# Patient Record
Sex: FEMALE | Race: BLACK OR AFRICAN AMERICAN | Employment: UNEMPLOYED | URBAN - METROPOLITAN AREA
[De-identification: names, ages, dates, MRNs, and addresses within clinical notes are randomized per-mention and may not be internally consistent; named-entity substitution may affect disease eponyms.]

---

## 2017-01-03 ENCOUNTER — TELEPHONE (OUTPATIENT)
Dept: FAMILY MEDICINE CLINIC | Age: 77
End: 2017-01-03

## 2017-01-03 ENCOUNTER — OFFICE VISIT (OUTPATIENT)
Dept: FAMILY MEDICINE CLINIC | Age: 77
End: 2017-01-03

## 2017-01-03 VITALS
HEART RATE: 103 BPM | OXYGEN SATURATION: 95 % | DIASTOLIC BLOOD PRESSURE: 73 MMHG | SYSTOLIC BLOOD PRESSURE: 129 MMHG | HEIGHT: 66 IN | RESPIRATION RATE: 19 BRPM | WEIGHT: 156 LBS | BODY MASS INDEX: 25.07 KG/M2 | TEMPERATURE: 99 F

## 2017-01-03 DIAGNOSIS — R19.00 PELVIC MASS IN FEMALE: Primary | ICD-10-CM

## 2017-01-03 DIAGNOSIS — R11.2 NON-INTRACTABLE VOMITING WITH NAUSEA, UNSPECIFIED VOMITING TYPE: ICD-10-CM

## 2017-01-03 RX ORDER — ONDANSETRON 4 MG/1
4 TABLET, ORALLY DISINTEGRATING ORAL
Qty: 30 TAB | Refills: 1 | Status: SHIPPED | OUTPATIENT
Start: 2017-01-03

## 2017-01-03 RX ORDER — ONDANSETRON 4 MG/1
4 TABLET, ORALLY DISINTEGRATING ORAL
Qty: 30 TAB | Refills: 1 | Status: SHIPPED | OUTPATIENT
Start: 2017-01-03 | End: 2017-01-03 | Stop reason: SDUPTHER

## 2017-01-03 RX ORDER — HYDROCODONE BITARTRATE AND ACETAMINOPHEN 5; 325 MG/1; MG/1
1 TABLET ORAL
Qty: 20 TAB | Refills: 0 | Status: SHIPPED | OUTPATIENT
Start: 2017-01-03

## 2017-01-03 RX ORDER — MEGESTROL ACETATE 20 MG/1
20 TABLET ORAL DAILY
Qty: 30 TAB | Refills: 1 | Status: SHIPPED | OUTPATIENT
Start: 2017-01-03

## 2017-01-03 RX ORDER — MEGESTROL ACETATE 20 MG/1
20 TABLET ORAL DAILY
Qty: 30 TAB | Refills: 1 | Status: SHIPPED | OUTPATIENT
Start: 2017-01-03 | End: 2017-01-03 | Stop reason: SDUPTHER

## 2017-01-03 NOTE — TELEPHONE ENCOUNTER
Returned call, and spoke with Erica Baez that due to transportation issues. Patient would like to keep already scheduled appointment on Thursday. All information has been faxed to [place] [facility] ahead of patients scheduled appointment.

## 2017-01-03 NOTE — MR AVS SNAPSHOT
Visit Information Date & Time Provider Department Dept. Phone Encounter #  
 1/3/2017  9:45 AM Nicholas Mejia, [address] [id number] [phone] Follow-up Instructions Return in about 2 weeks (around 1/17/2017) for pain and nausea. Upcoming Health Maintenance Date Due DTaP/Tdap/Td series (1 - Tdap) 6/1/1963 FOBT Q 1 YEAR AGE 50-75 6/1/1992 ZOSTER VACCINE AGE 60> 6/1/2002 GLAUCOMA SCREENING Q2Y 6/1/2007 OSTEOPOROSIS SCREENING (DEXA) 6/1/2007 Pneumococcal 65+ High/Highest Risk (1 of 2 - PCV13) 6/1/2007 MEDICARE YEARLY EXAM 10/4/2017 Allergies as of 1/3/2017  Review Complete On: 1/3/2017 By: Nicholas Mejia MD  
 No Known Allergies Current Immunizations  Never Reviewed No immunizations on file. Not reviewed this visit You Were Diagnosed With   
  
 Codes Comments Pelvic mass in female    -  Primary ICD-10-CM: R19.00 ICD-9-CM: 789.30 Non-intractable vomiting with nausea, unspecified vomiting type     ICD-10-CM: R11.2 ICD-9-CM: 787.01 Vitals BP Pulse Temp Resp Height(growth percentile) Weight(growth percentile) 129/73 (BP 1 Location: Right arm, BP Patient Position: Sitting) (!) 103 99 °F (37.2 °C) (Oral) 19 5' 6\" (1.676 m) 156 lb (70.8 kg) SpO2 BMI OB Status Smoking Status 95% 25.18 kg/m2 Postmenopausal Former Smoker BMI and BSA Data Body Mass Index Body Surface Area  
 25.18 kg/m 2 1.82 m 2 Preferred Pharmacy Pharmacy Name Phone [facility] - [address], [address] [phone] Your Updated Medication List  
  
   
This list is accurate as of: 1/3/17 10:17 AM.  Always use your most recent med list.  
  
  
  
  
 azelastine-fluticasone 137-50 mcg/spray [place]  
by Nasal route. cyanocobalamin 1,000 mcg tablet Take 1,000 mcg by mouth daily. diazePAM 5 mg tablet Commonly known as:  VALIUM  
 Take 5 mg by mouth every six (6) hours as needed for Anxiety. Take 1/2 tablet by mouth HYDROcodone-acetaminophen 5-325 mg per tablet Commonly known as:  Connie Higgins Take 1 Tab by mouth every six (6) hours as needed for Pain. Max Daily Amount: 4 Tabs.  
  
 loratadine 10 mg tablet Commonly known as:  Reyes Mariano Take 2 Tabs by mouth daily. Indications: ALLERGIC RHINITIS  
  
 lovastatin 40 mg tablet Commonly known as:  MEVACOR Take 40 mg by mouth nightly. megestrol 20 mg tablet Commonly known as:  MEGACE Take 1 Tab by mouth daily. ondansetron 4 mg disintegrating tablet Commonly known as:  ZOFRAN ODT Take 1 Tab by mouth every eight (8) hours as needed for Nausea. potassium chloride 20 mEq tablet Commonly known as:  K-DUR, KLOR-CON Take 1 Tab by mouth daily. * valsartan-hydroCHLOROthiazide 160-12.5 mg per tablet Commonly known as:  DIOVAN-HCT Take 1 Tab by mouth every evening. * valsartan-hydroCHLOROthiazide 80-12.5 mg per tablet Commonly known as:  DIOVAN-HCT Take 1 Tab by mouth daily. * Notice: This list has 2 medication(s) that are the same as other medications prescribed for you. Read the directions carefully, and ask your doctor or other care provider to review them with you. Prescriptions Printed Refills HYDROcodone-acetaminophen (NORCO) 5-325 mg per tablet 0 Sig: Take 1 Tab by mouth every six (6) hours as needed for Pain. Max Daily Amount: 4 Tabs. Class: Print Route: Oral  
  
Prescriptions Sent to Pharmacy Refills  
 ondansetron (ZOFRAN ODT) 4 mg disintegrating tablet 1 Sig: Take 1 Tab by mouth every eight (8) hours as needed for Nausea. Class: Normal  
 Pharmacy: [facility] [employer] [address] #: [phone] Route: Oral  
 megestrol (MEGACE) 20 mg tablet 1 Sig: Take 1 Tab by mouth daily.   
 Class: Normal  
 Pharmacy: [facility] [facility] [address] #: [phone] Route: Oral  
  
Follow-up Instructions Return in about 2 weeks (around 1/17/2017) for pain and nausea. Introducing [facility]! Dear Isai Khan: Thank you for requesting a [employer] account. Our records indicate that you already have an active [employer] account. You can access your account anytime at [email or web address] Did you know that you can access your hospital and ER discharge instructions at any time in [employer]? You can also review all of your test results from your hospital stay or ER visit. Additional Information If you have questions, please visit the Frequently Asked Questions section of the [employer] website at [email or web address] Remember, [employer] is NOT to be used for urgent needs. For medical emergencies, dial 911. Now available from your iPhone and Android! Please provide this summary of care documentation to your next provider. Your primary care clinician is listed as Kristopher Fay. If you have any questions after today's visit, please call [phone].

## 2017-01-03 NOTE — PROGRESS NOTES
Noreen Gregg is a 78 y.o.  female and presents with    Chief Complaint   Patient presents with   [facility] Follow Up       Subjective:  Ms. Alena Mclaughlin returns to the office after ER visit 2 weeks ago for ongoing abdominal pain and vomiting. She continues to have the symptoms. She reports that she has loss of appetite. She has used zofran with some improvement in symptoms. She feels unsteady with ambulation. She is only able to tolerate apple sauce. She has constipation and is using dulcolax. She reports that the miralax upset her stomach. She was found to have a pelvic mass in the ER. Cardiovascular Review:  The patient has hypertension and hyperlipidemia. Diet and Lifestyle: generally follows a low fat low cholesterol diet, generally follows a low sodium diet, nonsmoker  Home BP Monitoring: is well controlled at home. Pertinent ROS: taking medications as instructed, no medication side effects noted, no TIA's, no chest pain on exertion, no dyspnea on exertion, no swelling of ankles. Patient Active Problem List   Diagnosis Code    Hypertension I10    Hyperlipidemia E78.5    Allergic rhinitis J30.9    Advance care planning Z71.89     Patient Active Problem List    Diagnosis Date Noted    Allergic rhinitis 10/03/2016    Advance care planning 10/03/2016    Hypertension 03/01/2016    Hyperlipidemia 03/01/2016     Current Outpatient Prescriptions   Medication Sig Dispense Refill    ondansetron (ZOFRAN ODT) 4 mg disintegrating tablet Take 1 Tab by mouth every eight (8) hours as needed for Nausea. 10 Tab 0    valsartan-hydrochlorothiazide (DIOVAN-HCT) 80-12.5 mg per tablet Take 1 Tab by mouth daily. 90 Tab 3    valsartan-hydrochlorothiazide (DIOVAN-HCT) 160-12.5 mg per tablet Take 1 Tab by mouth every evening. 90 Tab 3    butalbital-acetaminophen-caffeine (FIORICET, ESGIC) -40 mg per tablet Take 1 Tab by mouth three (3) times daily.       cyanocobalamin 1,000 mcg tablet Take 1,000 mcg by mouth daily.  potassium chloride (K-DUR, KLOR-CON) 20 mEq tablet Take 1 Tab by mouth daily. 7 Tab 0    polyethylene glycol (MIRALAX) 17 gram packet Take 1 Packet by mouth daily. 30 Each 1    loratadine (CLARITIN) 10 mg tablet Take 2 Tabs by mouth daily. Indications: ALLERGIC RHINITIS 60 Tab 1    lovastatin (MEVACOR) 40 mg tablet Take 40 mg by mouth nightly.  diazepam (VALIUM) 5 mg tablet Take 5 mg by mouth every six (6) hours as needed for Anxiety. Take 1/2 tablet by mouth      azelastine-fluticasone 137-50 mcg/spray [place] by Nasal route. No Known Allergies  Past Medical History   Diagnosis Date    Allergic rhinitis     Hypercholesterolemia     Hypertension     Pelvic mass      Past Surgical History   Procedure Laterality Date    Hx wisdom teeth extraction       Family History   Problem Relation Age of Onset    Dementia Mother      Social History   Substance Use Topics    Smoking status: Former Smoker     Packs/day: 0.25     Years: 10.00    Smokeless tobacco: Never Used    Alcohol use No       ROS   General ROS: positive for  - weight loss  negative for - night sweats  Psychological ROS: negative for - anxiety or depression  Ophthalmic ROS: positive for - uses glasses  ENT ROS: positive for - headaches  Respiratory ROS: no cough, shortness of breath, or wheezing  Cardiovascular ROS: no chest pain or dyspnea on exertion  Gastrointestinal ROS: positive for - abdominal pain and nausea/vomiting  Genito-Urinary ROS: no dysuria, trouble voiding, or hematuria  Dermatological ROS: negative for - rash or skin lesion changes    All other systems reviewed and are negative.       Objective:  Vitals:    01/03/17 0949   BP: 129/73   Pulse: (!) 103   Resp: 19   Temp: 99 °F (37.2 °C)   TempSrc: Oral   SpO2: 95%   Weight: 156 lb (70.8 kg)   Height: 5' 6\" (1.676 m)   PainSc:   9   PainLoc: Head       alert, well appearing, and in no distress, oriented to person, place, and time and overweight  Mental status - normal mood, behavior, speech, dress, motor activity, and thought processes  Chest - clear to auscultation, no wheezes, rales or rhonchi, symmetric air entry  Heart - normal rate, regular rhythm, normal S1, S2, no murmurs, rubs, clicks or gallops  Abdomen - soft, nondistended, no masses or organomegaly  tenderness noted suprapubic    LABS   K 2.5  TESTS  CT abd pelv  IMPRESSION:  1. Very large mass arising from the pelvis with no normal uterine/cervical  tissue identified, presumably a primary malignancy. Small volume ascites without  definite nodularity to more strongly suggest carcinomatosis.     2. Mass results in moderate bilateral hydronephrosis/ureterectasis. Assessment/Plan:    1. Pelvic mass in female  Referred to gynecology oncology; treat symptoms of pain and nausea and anorexia  - ondansetron (ZOFRAN ODT) 4 mg disintegrating tablet; Take 1 Tab by mouth every eight (8) hours as needed for Nausea. Dispense: 30 Tab; Refill: 1  - megestrol (MEGACE) 20 mg tablet; Take 1 Tab by mouth daily. Dispense: 30 Tab; Refill: 1  - HYDROcodone-acetaminophen (NORCO) 5-325 mg per tablet; Take 1 Tab by mouth every six (6) hours as needed for Pain. Max Daily Amount: 4 Tabs. Dispense: 20 Tab; Refill: 0    2. Non-intractable vomiting with nausea, unspecified vomiting type  Needs potassium; continue antiemetic  - ondansetron (ZOFRAN ODT) 4 mg disintegrating tablet; Take 1 Tab by mouth every eight (8) hours as needed for Nausea. Dispense: 30 Tab; Refill: 1      Lab review: labs reviewed, I note that basic metabolic panel abnormal low K      I have discussed the diagnosis with the patient and the intended plan as seen in the above orders. The patient has received an after-visit summary and questions were answered concerning future plans. I have discussed medication side effects and warnings with the patient as well.  I have reviewed the plan of care with the patient, accepted their input and they are in agreement with the treatment goals. Follow-up Disposition:  Return in about 2 weeks (around 1/17/2017) for pain and nausea.

## 2017-01-03 NOTE — TELEPHONE ENCOUNTER
[facility] to pt [facility] appt coming up this week.  She stated there was an opening tomorrow but wanted to speak with Clifford Tsang first. Please assist.

## 2017-02-03 ENCOUNTER — PATIENT OUTREACH (OUTPATIENT)
Dept: FAMILY MEDICINE CLINIC | Age: 77
End: 2017-02-03

## 2017-02-03 NOTE — PROGRESS NOTES
Nurse Navigator  POST HOSPITALIZATION NOTE  Admitted at THE [facility] on 01/25/17 for hypokalemia  Discharged to Home on 01/28/17        -attempted to reach patient for post hosp f/u. Unable to reach. Left message on voicemail      -Per DC summary, GYN office Dr. Indiana Escobar supposed to follow up with patient to reschedule surgery. I call Dr. Lamar Decker office ([phone]).  Attempted to reach , unable to reach, left message on voicemail with contact info      Will call again to follow up

## 2017-02-06 ENCOUNTER — TELEPHONE (OUTPATIENT)
Dept: FAMILY MEDICINE CLINIC | Age: 77
End: 2017-02-06

## 2017-02-06 ENCOUNTER — PATIENT OUTREACH (OUTPATIENT)
Dept: FAMILY MEDICINE CLINIC | Age: 77
End: 2017-02-06

## 2017-02-06 NOTE — PROGRESS NOTES
Nurse Navigator  POST HOSPITALIZATION NOTE  Admitted at THE [facility] on 01/25/17 for hypokalemia  Discharged to Home on 01/28/17      Noted telephone encounter, pt's surgery rescheduled to 02/08/17.  Pt declined call back from NN, will follow up as needed

## 2017-02-06 NOTE — TELEPHONE ENCOUNTER
Pt returning call from Rob. Does not wish to be called back but wanted to let her know her surgery wa postponed due to low Potassium. It is R/s for 2/8/17 @ 5:30am. Thank you for calling.

## 2017-02-16 ENCOUNTER — PATIENT OUTREACH (OUTPATIENT)
Dept: FAMILY MEDICINE CLINIC | Age: 77
End: 2017-02-16

## 2017-02-16 NOTE — PROGRESS NOTES
Nurse Navigator  POST HOSPITALIZATION NOTE  -Admitted at THE [facility] on 01/25/17 for hypokalemia  -Discharged to Home on 01/28/17  -Readmitted for scheduled admission 02/08/17 at THE [facility] for abdominal surgery (Ovarian tumor)  -Discharged to Home on 02/14/17      Attempted to reach patient for post hosp f/u. Unable to reach.  Left message on voicemail with contact info      Will call again to follow up for post hosp

## 2017-02-17 ENCOUNTER — PATIENT OUTREACH (OUTPATIENT)
Dept: FAMILY MEDICINE CLINIC | Age: 77
End: 2017-02-17

## 2017-02-17 NOTE — PROGRESS NOTES
Nurse Navigator  POST HOSPITALIZATION NOTE  -Admitted at THE [facility] on 01/25/17 for hypokalemia  -Discharged to Home on 01/28/17  -Readmitted for scheduled admission 02/08/17 at THE [facility] for abdominal surgery (Ovarian tumor)  -Discharged to Home on 02/14/17      -Emelda Francisco Dr. Ardella Bosworth office ([phone]). Spoke to . Pt's identity verified x2. Per , no follow up appt currently scheduled for the patient at this time    -Attempted to reach patient again for post hosp f/u. Unable to reach.  Left message on voicemail with contact info requesting call back      Letter sent

## 2017-02-17 NOTE — LETTER
2/17/2017 11:19 AM 
 
Ms. Lorne Schilder [address] [facility] [address] [id number] Dear Ms. Amilcar Vivar am a Nurse Navigator working with your primary care provider (PCP), Katty Milian. Part of my job is to follow up with patients who have been in the hospital to see how they are feeling, answer any questions they may have about their visit, and also make sure they have a follow-up appointment to see their primary care doctor and/or specialists. I have been unable to reach you by telephone and wanted to make sure that you schedule a follow-up appointment to come in and talk to your specialist, Dr. Frances Askew, as per your  discharge instructions. Please call Dr. Cornel Madrigal office at [phone] to schedule your follow up appointment as soon as possible. If you have any questions or concerns, please don't hesitate to call your PCP office at [phone]. Thank you for allowing us to participate in your care! Sincerely, Chloe MELENDEZN, RN   Nurse Navigator [address] Office   [phone] Fax   [phone]

## 2017-02-21 ENCOUNTER — PATIENT OUTREACH (OUTPATIENT)
Dept: FAMILY MEDICINE CLINIC | Age: 77
End: 2017-02-21

## 2017-02-21 ENCOUNTER — TELEPHONE (OUTPATIENT)
Dept: FAMILY MEDICINE CLINIC | Age: 77
End: 2017-02-21

## 2017-02-21 ENCOUNTER — NURSE NAVIGATOR (OUTPATIENT)
Dept: FAMILY MEDICINE CLINIC | Age: 77
End: 2017-02-21

## 2017-02-21 NOTE — PROGRESS NOTES
Nurse Navigator  POST HOSPITALIZATION NOTE  -Admitted at THE [facility] on 17 for hypokalemia  -Discharged to Home on 17  -Readmitted for scheduled admission 17 at THE [facility] for abdominal surgery (Ovarian tumor)  -Discharged to Home on 17         START: 1020   END: [facility]    Received phone call from patient. Verified patient's identity using name and . Introduced self/role and purpose of call. Pt stated \"I'm doing just fine. \" Pt denied any CP, SOB, fever/chills, NVD, HA/dizziness, numbness/tingling, bleeding, swelling, pain. Pt also reported incision site clean, dry, intact; denied any bleeding, drainage, swelling, redness. Currently open to air. Pt voiced no concerns at this time. Pt reported appetite better. She has been able to eat without having any nausea or vomiting. Pt stated she used to be a  and prepares her own food. Pt lives alone, but can call her brother for assistance. Reviewed home medication and patient verbalized understanding self management of medications. Pt reported she has Percocet if needed for pain, pt also reported taking theragran daily, colace daily, KDur 20Meq twice daily, zofran as needed. Pt stated she takes Diovan 160mg, cuts the pill in half and takes it twice daily. Pt also reported she is not taking HCTZ (recently prescribed per DC summary). Pt reported no problems with her BP, any HA/dizziness. Reviewed red flags for CP, SOB, fever/chills, NVD, bleeding, swelling, drainage, increase in pain, numbness/tingling, HA/dizziness, fall, LOC and patient understands when to seek medical attention from PCP/ED. Offered follow up appt with PCP Dr. Yaya Hathaway, pt declined at this time. Stated she will follow up with PCP after seeing other specialist. She will call to schedule a future appt. Pt does not have follow up appt with GYN Dr. Eli Rajan and requests NN to schedule for her.  NN will call to schedule and call her back to provide appt info. Patient verbalized understanding of discharge plan and special follow up with PCP/GYN. Reviewed plan of care. Patient has provided input to plan and agrees with goals. Answered any questions patient had. Provided contact info for any additional questions. This note will not be viewable in [address]

## 2017-02-21 NOTE — TELEPHONE ENCOUNTER
Pt called wanting to speak with Harvinder Argueta but she was on a call. I told her I could put in a message for Harvinder Argueta to call her back but she refused the message and said she would call back.

## 2017-02-22 NOTE — PROGRESS NOTES
Nurse Navigator  POST HOSPITALIZATION NOTE  -Admitted at THE [facility] on 01/25/17 for hypokalemia  -Discharged to Home on 01/28/17  -Readmitted for scheduled admission 02/08/17 at THE [facility] for abdominal surgery (Ovarian tumor)  -Discharged to Home on 02/14/17        -Emelda Francisco Dr. Ardella Bosworth office ([phone]). Follow up appt scheduled for patient on 03/02/17 at 4pm. Per , patient to follow up in 2 weeks post discharge. Dr. Mumtaz Lira not available on 02/28/17 and this is the earliest next appt available. I verbalized understanding. I also spoke to nurse Deatrice Barnet in regards to wound care. Per nurse, OK to leave incision site open to air, shower baths only, cleanse with soap and water only, and do not use wash cloth. I verbalized understanding    -Called back patient to relay appt info. She verbalized understanding. Stated her brother will be bringing her to the appt. Patient provided Dr. Ardella Bosworth address and office number. Also relayed info regarding wound care. Patient verbalized understanding.  NN contact info provided for any additions questions/concerns

## 2017-03-13 ENCOUNTER — TELEPHONE (OUTPATIENT)
Dept: FAMILY MEDICINE CLINIC | Age: 77
End: 2017-03-13

## 2017-03-13 NOTE — TELEPHONE ENCOUNTER
I contacted patient, and she just wanted to clarify that we know she takes two doses of valsartan, and that it has not changed. I informed patient that per our records she takes 1 tablet of valsartan 80-12.5mg tablet daily,  And of the valsartan 160-12.5mg a 1/2 tablet at noon, and 1/2 tablet at 5 pm. Patient stated that that was correct and had no further questions.

## 2017-03-13 NOTE — TELEPHONE ENCOUNTER
Pt called stating that she has been in and out of the hospital and has been having issues with her medication. She stated that Dr. Yaya Hathaway prescribed two Valsartan medications for her to take but when she went into the ED they made her stop taking one of them and her blood pressure went up. She stated that they tried to prescribe her a different type of High Blood pressure pill but she refused to take it because she prefers to take the one that Dr. Yaya Hathaway prescribed her. She would like to know more medical advice.  Please assist.

## 2017-03-29 ENCOUNTER — DOCUMENTATION ONLY (OUTPATIENT)
Dept: FAMILY MEDICINE CLINIC | Age: 77
End: 2017-03-29

## 2017-03-29 NOTE — PROGRESS NOTES
Patient called and wanted to have appointment canceled for her GYN appointment. Patient started to tell me that she was feeling dizzy and that she felt sick. Patient stated that she may have over done her errands yesterday. I called and spoke with Eb Claros LPN and wanted to have the aptient come in for a Same Day Sick appointment. I asked the patient if she could come in for an appointment, Patient denied coming in for an appointment, Patient stated that she has an appointment tomorrow and does not want to over do it again.

## 2017-03-30 NOTE — PROGRESS NOTES
I called patient to follow up on call to office yesterday. Patient stated that she has been very dizzy since Tuesday, when she went shopping with her brother. Patient stated that her blood pressure has been \"great\" most recent reading was 114/75. Patient also c/o stomach pain, nausea, and a loss of appetite . Patient states she has been taking Oxycodone I advised patient that these are some common side effects of Oxycodone. Patient has stopped taking Oxycodone, and her symptoms seem to be better. Patient stated that she took the medication because it worked in hospital, and she had a bit of pain. Patient has follow up with Dr. Hany Yepez  next Tuesday. Patient advised to contact office if she has any further questions, or if symptoms return. Patient declined to schedule a follow up at this time, because she gets stressed out seeing more than one doctor at the moment.

## 2017-05-30 ENCOUNTER — DOCUMENTATION ONLY (OUTPATIENT)
Dept: FAMILY MEDICINE CLINIC | Age: 77
End: 2017-05-30

## 2017-05-30 NOTE — PROGRESS NOTES
DOCUMENTATION ONLY: [facility] Discharge Documentation has been received and was sent to central scanning. Patient was admitted into the hospital from the dates 5/26/2017 - 05/29/2017.

## 2017-06-01 ENCOUNTER — PATIENT OUTREACH (OUTPATIENT)
Dept: FAMILY MEDICINE CLINIC | Age: 77
End: 2017-06-01

## 2017-06-01 NOTE — PROGRESS NOTES
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 Unable to reach. Left message on voicemail . Contact info provided      No future appt currently scheduled at this time      Will call patient again to f/u              This note will not be viewable in [employer].

## 2017-06-02 ENCOUNTER — PATIENT OUTREACH (OUTPATIENT)
Dept: FAMILY MEDICINE CLINIC | Age: 77
End: 2017-06-02

## 2019-07-17 NOTE — PROGRESS NOTES
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 He informed me patient patient currently in [facility]. I thanked him and informed him if there is any questions or anything needed from PCP office to give the office a call. He verbalized understanding. [facility] ([phone]) and spoke to staff. Pt's identity verified x2. Staff informed me patient currently admitted at [facility] and Hospice MD will be following patient.  I verbalized understanding       Will follow up as needed XRAY SHOWS SOME MILD ARTHRITIS AT THE JOINT WHERE THE COLLAR BONE ( CLAVICLE) JOINS THE WING BONE( SCAPULA)  OTHERWISE NORMAL